# Patient Record
Sex: FEMALE
[De-identification: names, ages, dates, MRNs, and addresses within clinical notes are randomized per-mention and may not be internally consistent; named-entity substitution may affect disease eponyms.]

---

## 2020-01-06 PROBLEM — Z00.00 ENCOUNTER FOR PREVENTIVE HEALTH EXAMINATION: Status: ACTIVE | Noted: 2020-01-06

## 2020-02-10 ENCOUNTER — APPOINTMENT (OUTPATIENT)
Dept: PULMONOLOGY | Facility: CLINIC | Age: 45
End: 2020-02-10
Payer: MEDICARE

## 2020-02-10 VITALS
SYSTOLIC BLOOD PRESSURE: 134 MMHG | DIASTOLIC BLOOD PRESSURE: 87 MMHG | TEMPERATURE: 97.6 F | BODY MASS INDEX: 35.39 KG/M2 | OXYGEN SATURATION: 93 % | HEIGHT: 65.5 IN | WEIGHT: 215 LBS | RESPIRATION RATE: 16 BRPM | HEART RATE: 67 BPM

## 2020-02-10 DIAGNOSIS — Z87.2 PERSONAL HISTORY OF DISEASES OF THE SKIN AND SUBCUTANEOUS TISSUE: ICD-10-CM

## 2020-02-10 DIAGNOSIS — Z86.39 PERSONAL HISTORY OF OTHER ENDOCRINE, NUTRITIONAL AND METABOLIC DISEASE: ICD-10-CM

## 2020-02-10 DIAGNOSIS — Z87.19 PERSONAL HISTORY OF OTHER DISEASES OF THE DIGESTIVE SYSTEM: ICD-10-CM

## 2020-02-10 DIAGNOSIS — G47.30 SLEEP APNEA, UNSPECIFIED: ICD-10-CM

## 2020-02-10 DIAGNOSIS — Z83.3 FAMILY HISTORY OF DIABETES MELLITUS: ICD-10-CM

## 2020-02-10 PROCEDURE — ZZZZZ: CPT

## 2020-02-10 PROCEDURE — 94010 BREATHING CAPACITY TEST: CPT

## 2020-02-10 PROCEDURE — 99203 OFFICE O/P NEW LOW 30 MIN: CPT | Mod: 25

## 2020-02-10 PROCEDURE — 94726 PLETHYSMOGRAPHY LUNG VOLUMES: CPT

## 2020-02-10 PROCEDURE — 94729 DIFFUSING CAPACITY: CPT

## 2020-02-10 RX ORDER — LEVOTHYROXINE SODIUM 137 UG/1
TABLET ORAL
Refills: 0 | Status: ACTIVE | COMMUNITY

## 2020-02-10 NOTE — HISTORY OF PRESENT ILLNESS
[TextBox_4] : 44-year-old female with the complaint of sleepiness. The patient reports that several years ago , because of hypersomnolence, she underwent a sleep study in [place] and was found to have sleep apnea but she never followed up. Currently she reports some excessive sleepiness and frequent awakenings during the night. She is otherwise in good health. She suffers from hypothyroidism but currently is euthyroid. She denies any history of hypertension or cardiac disease.

## 2020-02-10 NOTE — PHYSICAL EXAM
[No Acute Distress] : no acute distress [Low Lying Soft Palate] : low lying soft palate [III] : Mallampati Class: III [Normal Appearance] : normal appearance [Normal Rate/Rhythm] : normal rate/rhythm [No Resp Distress] : no resp distress [Normal Gait] : normal gait [No Clubbing] : no clubbing [No Edema] : no edema [No Focal Deficits] : no focal deficits

## 2020-02-10 NOTE — REVIEW OF SYSTEMS
[Fatigue] : fatigue [Cough] : cough [GERD] : gerd [Rash] : rash [Negative] : Neurologic [TextBox_57] : eczema

## 2020-02-10 NOTE — ASSESSMENT
[FreeTextEntry1] : based on the patient's history, it is quite probable that she has an element of obstructive sleep apnea. I suggested a nocturnal polysomnogram and once that is arranged and results are known, I will followup with the patient.\par \par Notes sent to Dr Tomas

## 2020-04-13 ENCOUNTER — APPOINTMENT (OUTPATIENT)
Dept: SLEEP CENTER | Facility: CLINIC | Age: 45
End: 2020-04-13

## 2020-05-29 ENCOUNTER — APPOINTMENT (OUTPATIENT)
Dept: SLEEP CENTER | Facility: CLINIC | Age: 45
End: 2020-05-29
Payer: MEDICAID

## 2020-06-06 ENCOUNTER — FORM ENCOUNTER (OUTPATIENT)
Age: 45
End: 2020-06-06

## 2020-06-07 ENCOUNTER — OUTPATIENT (OUTPATIENT)
Dept: OUTPATIENT SERVICES | Facility: HOSPITAL | Age: 45
LOS: 1 days | End: 2020-06-07
Payer: MEDICAID

## 2020-06-07 PROCEDURE — 95806 SLEEP STUDY UNATT&RESP EFFT: CPT

## 2020-06-07 PROCEDURE — 95806 SLEEP STUDY UNATT&RESP EFFT: CPT | Mod: 26

## 2020-06-09 ENCOUNTER — APPOINTMENT (OUTPATIENT)
Dept: SLEEP CENTER | Facility: CLINIC | Age: 45
End: 2020-06-09

## 2020-06-15 DIAGNOSIS — G47.33 OBSTRUCTIVE SLEEP APNEA (ADULT) (PEDIATRIC): ICD-10-CM

## 2020-07-19 ENCOUNTER — FORM ENCOUNTER (OUTPATIENT)
Age: 45
End: 2020-07-19

## 2020-08-04 ENCOUNTER — FORM ENCOUNTER (OUTPATIENT)
Age: 45
End: 2020-08-04

## 2020-08-12 ENCOUNTER — NON-APPOINTMENT (OUTPATIENT)
Age: 45
End: 2020-08-12

## 2020-08-12 ENCOUNTER — APPOINTMENT (OUTPATIENT)
Dept: OPHTHALMOLOGY | Facility: CLINIC | Age: 45
End: 2020-08-12
Payer: MEDICAID

## 2020-08-12 PROCEDURE — 92004 COMPRE OPH EXAM NEW PT 1/>: CPT

## 2020-08-12 PROCEDURE — 92285 EXTERNAL OCULAR PHOTOGRAPHY: CPT

## 2020-08-12 PROCEDURE — 92015 DETERMINE REFRACTIVE STATE: CPT

## 2020-08-24 ENCOUNTER — APPOINTMENT (OUTPATIENT)
Dept: PULMONOLOGY | Facility: CLINIC | Age: 45
End: 2020-08-24

## 2020-08-24 ENCOUNTER — APPOINTMENT (OUTPATIENT)
Dept: PULMONOLOGY | Facility: CLINIC | Age: 45
End: 2020-08-24
Payer: MEDICAID

## 2020-08-24 DIAGNOSIS — G47.33 OBSTRUCTIVE SLEEP APNEA (ADULT) (PEDIATRIC): ICD-10-CM

## 2020-08-24 PROCEDURE — 99215 OFFICE O/P EST HI 40 MIN: CPT | Mod: 95

## 2020-08-24 RX ORDER — LANSOPRAZOLE 30 MG/1
30 CAPSULE, DELAYED RELEASE ORAL
Refills: 0 | Status: DISCONTINUED | COMMUNITY
End: 2020-08-24

## 2020-08-24 NOTE — ASSESSMENT
[FreeTextEntry1] : 44 year old JOHN FALCON with hypothyroidism, obesity, prediabetes, GERD, eczema; is compliant, tolerating and benefitting from APAP therapy for mild symptomatic OH since July 2020.\par \par Mild symptomatic OH on APAP 4-20 cm H2O using full-face mask.\par \par - Reviewed compliance and therapy data with patient\par - Compliant on 100% of nights averaging 5 hours 45 minutes\par - Effective pressure at 4-20 cmH2O with therapy based AHI of 1.7.\par - Acceptable mask leak (0 secs)\par \par - Benefit from APAP noted: improvement in daytime sleepiness and nocturnal awakenings.\par - Edited prescription via [facility] to max pressure 14 cmH2O. \par - Request made for [facility] to replace full-face with nasal mask to continue therapy at 4-14 cm H2O. \par - the ramifications of sleep apnea and benefit from therapy were reviewed.\par \par SLEEP HYGIENE measures were reviewed including avoid watching TV in bed, avoid gazing at the clock during awakenings.\par \par IRREGULAR SLEEP SCHEDULE - Advised to regularize sleep schedule to avoid delayed sleep.  Start with a fixed time out of bed, then fix bedtime to 6-hours before.  Every 3-7 days, move bedtime earlier by 30-minutes if sleeping most of the night, for goal of 7-7.5 hours of sleep.  Avoid naps close to bedtime, and restrict naps to <=40-minutes' duration.\par \par Advised patient to consider weight loss, which may improve or resolve sleep apnea.  \par \par She will follow up in one year or sooner if needed.

## 2020-08-24 NOTE — PHYSICAL EXAM
[Normal Appearance] : normal appearance [Well Groomed] : well groomed [Affect] : the affect was normal [General Appearance - In No Acute Distress] : no acute distress [Mood] : the mood was normal

## 2020-08-24 NOTE — REVIEW OF SYSTEMS
[Chest Pain] : chest pain [Obesity] : obesity [Thyroid Disease] : thyroid disease [History of Iron Deficiency] : history of iron deficiency [Heartburn] : heartburn [Arthralgias] : arthralgias [Negative] : Neurologic [Fatigue] : no fatigue [Postnasal Drip] : no postnasal drip [Shortness Of Breath] : no shortness of breath [Palpitations] : no palpitations [Edema] : ~T edema was not present [Diabetes] : no diabetes  [Anemia] : no anemia [Nocturia] : no nocturia [Depression] : no depression [Anxious] : not anxious [FreeTextEntry8] : occasional unilateral nasal congestion  [FreeTextEntry9] : minor CP- consulted with cardiologist [de-identified] : prediabetes [de-identified] : past

## 2020-10-26 ENCOUNTER — NON-APPOINTMENT (OUTPATIENT)
Age: 45
End: 2020-10-26

## 2021-08-06 ENCOUNTER — NON-APPOINTMENT (OUTPATIENT)
Age: 46
End: 2021-08-06

## 2022-12-20 ENCOUNTER — APPOINTMENT (OUTPATIENT)
Dept: OPHTHALMOLOGY | Facility: CLINIC | Age: 47
End: 2022-12-20

## 2023-04-06 ENCOUNTER — APPOINTMENT (OUTPATIENT)
Dept: OPHTHALMOLOGY | Facility: CLINIC | Age: 48
End: 2023-04-06
Payer: MEDICAID

## 2023-04-06 ENCOUNTER — NON-APPOINTMENT (OUTPATIENT)
Age: 48
End: 2023-04-06

## 2023-04-06 PROCEDURE — 92015 DETERMINE REFRACTIVE STATE: CPT | Mod: NC

## 2023-04-06 PROCEDURE — 92014 COMPRE OPH EXAM EST PT 1/>: CPT

## 2023-10-11 ENCOUNTER — APPOINTMENT (OUTPATIENT)
Dept: PULMONOLOGY | Facility: CLINIC | Age: 48
End: 2023-10-11

## 2025-01-14 ENCOUNTER — APPOINTMENT (OUTPATIENT)
Dept: OPHTHALMOLOGY | Facility: CLINIC | Age: 50
End: 2025-01-14